# Patient Record
Sex: FEMALE | Race: WHITE | NOT HISPANIC OR LATINO | Employment: OTHER | URBAN - NONMETROPOLITAN AREA
[De-identification: names, ages, dates, MRNs, and addresses within clinical notes are randomized per-mention and may not be internally consistent; named-entity substitution may affect disease eponyms.]

---

## 2017-08-07 ENCOUNTER — APPOINTMENT (EMERGENCY)
Dept: RADIOLOGY | Facility: HOSPITAL | Age: 52
End: 2017-08-07
Payer: MEDICARE

## 2017-08-07 ENCOUNTER — HOSPITAL ENCOUNTER (EMERGENCY)
Facility: HOSPITAL | Age: 52
Discharge: HOME | End: 2017-08-07
Admitting: EMERGENCY MEDICINE
Payer: MEDICARE

## 2017-08-07 VITALS
TEMPERATURE: 97.5 F | DIASTOLIC BLOOD PRESSURE: 83 MMHG | RESPIRATION RATE: 18 BRPM | WEIGHT: 190 LBS | SYSTOLIC BLOOD PRESSURE: 143 MMHG | OXYGEN SATURATION: 97 % | HEART RATE: 82 BPM

## 2017-08-07 DIAGNOSIS — S81.809A MULTIPLE OPEN WOUNDS OF LOWER LEG: Primary | ICD-10-CM

## 2017-08-07 PROCEDURE — 73590 X-RAY EXAM OF LOWER LEG: CPT

## 2017-08-07 PROCEDURE — 99283 EMERGENCY DEPT VISIT LOW MDM: CPT

## 2017-08-07 RX ORDER — SULFAMETHOXAZOLE AND TRIMETHOPRIM 800; 160 MG/1; MG/1
2 TABLET ORAL EVERY 12 HOURS SCHEDULED
Qty: 40 TABLET | Refills: 0 | Status: SHIPPED | OUTPATIENT
Start: 2017-08-07 | End: 2017-08-17

## 2017-08-07 RX ORDER — ASPIRIN 325 MG
325 TABLET ORAL DAILY
COMMUNITY

## 2017-08-07 RX ORDER — LORAZEPAM 1 MG/1
0.5 TABLET ORAL
COMMUNITY

## 2017-08-27 ENCOUNTER — APPOINTMENT (EMERGENCY)
Dept: RADIOLOGY | Facility: HOSPITAL | Age: 52
End: 2017-08-27
Payer: MEDICARE

## 2017-08-27 ENCOUNTER — HOSPITAL ENCOUNTER (EMERGENCY)
Facility: HOSPITAL | Age: 52
Discharge: HOME | End: 2017-08-27
Admitting: EMERGENCY MEDICINE
Payer: MEDICARE

## 2017-08-27 VITALS
DIASTOLIC BLOOD PRESSURE: 68 MMHG | SYSTOLIC BLOOD PRESSURE: 124 MMHG | HEART RATE: 88 BPM | RESPIRATION RATE: 18 BRPM | WEIGHT: 192 LBS | OXYGEN SATURATION: 98 % | TEMPERATURE: 97.1 F
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
## 2017-08-27 DIAGNOSIS — M25.471 PAIN AND SWELLING OF RIGHT ANKLE: ICD-10-CM

## 2017-08-27 DIAGNOSIS — S80.811A ABRASION OF RIGHT LOWER LEG WITH INFECTION, INITIAL ENCOUNTER: ICD-10-CM

## 2017-08-27 DIAGNOSIS — M25.571 PAIN AND SWELLING OF RIGHT ANKLE: ICD-10-CM

## 2017-08-27 DIAGNOSIS — L08.9 ABRASION OF RIGHT LOWER LEG WITH INFECTION, INITIAL ENCOUNTER: ICD-10-CM

## 2017-08-27 PROCEDURE — 90715 TDAP VACCINE 7 YRS/> IM: CPT | Performed by: PHYSICIAN ASSISTANT

## 2017-08-27 PROCEDURE — 73610 X-RAY EXAM OF ANKLE: CPT

## 2017-08-27 PROCEDURE — 99283 EMERGENCY DEPT VISIT LOW MDM: CPT

## 2017-08-27 PROCEDURE — 90471 IMMUNIZATION ADMIN: CPT

## 2017-08-27 PROCEDURE — 73590 X-RAY EXAM OF LOWER LEG: CPT

## 2017-08-27 RX ORDER — SULFAMETHOXAZOLE AND TRIMETHOPRIM 800; 160 MG/1; MG/1
1 TABLET ORAL 2 TIMES DAILY
Qty: 14 TABLET | Refills: 0 | Status: SHIPPED | OUTPATIENT
Start: 2017-08-27 | End: 2017-09-03

## 2017-08-27 RX ORDER — HYDROCODONE BITARTRATE AND ACETAMINOPHEN 5; 325 MG/1; MG/1
1 TABLET ORAL EVERY 6 HOURS PRN
Qty: 10 TABLET | Refills: 0 | Status: SHIPPED | OUTPATIENT
Start: 2017-08-27

## 2017-08-27 RX ORDER — BACITRACIN, NEOMYCIN, POLYMYXIN B 400; 3.5; 5 [USP'U]/G; MG/G; [USP'U]/G
OINTMENT TOPICAL 2 TIMES DAILY
Qty: 15 G | Refills: 0 | Status: SHIPPED | OUTPATIENT
Start: 2017-08-27

## 2017-08-27 RX ADMIN — TETANUS TOXOID, REDUCED DIPHTHERIA TOXOID AND ACELLULAR PERTUSSIS VACCINE, ADSORBED 0.5 ML: 5; 2.5; 8; 8; 2.5 SUSPENSION INTRAMUSCULAR at 12:08

## 2020-06-17 ENCOUNTER — HOSPITAL ENCOUNTER (EMERGENCY)
Facility: HOSPITAL | Age: 55
Discharge: HOME | End: 2020-06-17
Attending: EMERGENCY MEDICINE | Admitting: EMERGENCY MEDICINE
Payer: MEDICARE

## 2020-06-17 VITALS
RESPIRATION RATE: 18 BRPM | TEMPERATURE: 97.6 F | WEIGHT: 194.45 LBS | SYSTOLIC BLOOD PRESSURE: 134 MMHG | HEART RATE: 82 BPM | OXYGEN SATURATION: 98 % | DIASTOLIC BLOOD PRESSURE: 73 MMHG

## 2020-06-17 DIAGNOSIS — Z51.89 VISIT FOR WOUND CHECK: Primary | ICD-10-CM

## 2020-06-17 DIAGNOSIS — S80.819A CAT SCRATCH OF LOWER LEG, INITIAL ENCOUNTER: ICD-10-CM
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
## 2020-06-17 PROCEDURE — 99282 EMERGENCY DEPT VISIT SF MDM: CPT

## 2020-06-17 PROCEDURE — 99284 EMERGENCY DEPT VISIT MOD MDM: CPT | Performed by: EMERGENCY MEDICINE

## 2020-06-17 RX ORDER — DOXYCYCLINE HYCLATE 100 MG/1
100 CAPSULE ORAL 2 TIMES DAILY
Qty: 14 CAPSULE | Refills: 0 | Status: SHIPPED | OUTPATIENT
Start: 2020-06-17 | End: 2020-06-24

## 2022-06-22 ENCOUNTER — CLINICAL SUPPORT (OUTPATIENT)
Dept: URGENT CARE | Facility: CLINIC | Age: 57
End: 2022-06-22

## 2022-06-22 DIAGNOSIS — R94.31 ABNORMAL EKG: Primary | ICD-10-CM

## 2023-08-23 ENCOUNTER — OFFICE VISIT (OUTPATIENT)
Dept: URGENT CARE | Facility: CLINIC | Age: 58
End: 2023-08-23
Payer: MEDICARE

## 2023-08-23 ENCOUNTER — APPOINTMENT (OUTPATIENT)
Dept: RADIOLOGY | Facility: CLINIC | Age: 58
End: 2023-08-23
Payer: MEDICARE

## 2023-08-23 VITALS
SYSTOLIC BLOOD PRESSURE: 136 MMHG | DIASTOLIC BLOOD PRESSURE: 70 MMHG | TEMPERATURE: 98 F | RESPIRATION RATE: 20 BRPM | OXYGEN SATURATION: 97 % | WEIGHT: 198.2 LBS | HEART RATE: 77 BPM

## 2023-08-23 DIAGNOSIS — J40 BRONCHITIS, NOT SPECIFIED AS ACUTE OR CHRONIC: Primary | ICD-10-CM

## 2023-08-23 DIAGNOSIS — R05.1 ACUTE COUGH: ICD-10-CM

## 2023-08-23 LAB
SARS-COV-2 AG UPPER RESP QL IA: NEGATIVE
VALID CONTROL: NORMAL

## 2023-08-23 PROCEDURE — 71046 X-RAY EXAM CHEST 2 VIEWS: CPT

## 2023-08-23 PROCEDURE — G0463 HOSPITAL OUTPT CLINIC VISIT: HCPCS

## 2023-08-23 PROCEDURE — 87811 SARS-COV-2 COVID19 W/OPTIC: CPT

## 2023-08-23 PROCEDURE — 99213 OFFICE O/P EST LOW 20 MIN: CPT

## 2023-08-23 RX ORDER — ALBUTEROL SULFATE 90 UG/1
2 AEROSOL, METERED RESPIRATORY (INHALATION) EVERY 6 HOURS PRN
Qty: 8.5 G | Refills: 0 | Status: SHIPPED | OUTPATIENT
Start: 2023-08-23

## 2023-08-23 RX ORDER — PREDNISONE 20 MG/1
40 TABLET ORAL DAILY
Qty: 10 TABLET | Refills: 0 | Status: SHIPPED | OUTPATIENT
Start: 2023-08-23 | End: 2023-08-28

## 2023-08-23 RX ORDER — AZITHROMYCIN 250 MG/1
TABLET, FILM COATED ORAL
Qty: 6 TABLET | Refills: 0 | Status: SHIPPED | OUTPATIENT
Start: 2023-08-23 | End: 2023-08-27

## 2023-08-23 RX ORDER — BENZONATATE 200 MG/1
200 CAPSULE ORAL 3 TIMES DAILY PRN
Qty: 20 CAPSULE | Refills: 0 | Status: SHIPPED | OUTPATIENT
Start: 2023-08-23

## 2023-08-23 NOTE — PROGRESS NOTES
[facility]        NAME: Linda Sparks is a 56 y.o. female  : 1965    MRN: [id number]  DATE: 2023  TIME: 12:19 PM    Assessment and Plan   Bronchitis, not specified as acute or chronic [J40]  1. Bronchitis, not specified as acute or chronic  Poct Covid 19 Rapid Antigen Test    Ambulatory Referral to [facility]    XR chest pa & lateral    benzonatate (TESSALON) 200 MG capsule    albuterol (ProAir HFA) 90 mcg/act inhaler    predniSONE 20 mg tablet    azithromycin (ZITHROMAX) 250 mg tablet        Rapid COVID negative  cxr- no acute pneumonia noted. Tessalon for cough, albuterol for sob, steroid and azithromycin for bronchitis and COPD exacerbation. Referral to PCP placed. Patient Instructions     Rapid COVID negative  Cxr- no acute findings to indicate pneumonia. Please start the steroid to help with inflammation. Take this once daily in the morning with food. Take antibiotic for inflammatory properties given underlying COPD. Use albuterol inhaler 2 puffs every 6 hours for shortness of breath or wheezing. Smoking cessation  Continue mucinex over the counter. Tessalon for cough as needed up to three times a day otherwise may use robitussin. Push fluids. Establish a PCP. If symptoms worsen proceed to the ED. Follow up with PCP in 3-5 days. Proceed to  ER if symptoms worsen. Chief Complaint     Chief Complaint   Patient presents with   • Cold Like Symptoms     Productive cough of green phlegm and chest congestion. History of Present Illness       Patient is a 56year old female who presents to the office today for productive cough for about 3 days. Is taking robitussin at home with some cough relief. Also complains of chest congestion. Cough is productive for yellow/green sputum. Denies fever, ear pain. Has decreased activity tolerance. Denies chest pain, wheezing. Does currently smoke about 1 pack a day, has cut down r/t illness. Does have hx of COPD. Review of Systems   Review of Systems   Constitutional: Negative for chills and fever. HENT: Positive for congestion. Negative for ear pain, postnasal drip, sinus pressure, sinus pain and sore throat. Respiratory: Positive for cough and shortness of breath. Negative for wheezing. Cardiovascular: Negative for chest pain and palpitations. Gastrointestinal: Negative for diarrhea, nausea and vomiting. All other systems reviewed and are negative. Current Medications       Current Outpatient Medications:   •  albuterol (ProAir HFA) 90 mcg/act inhaler, Inhale 2 puffs every 6 (six) hours as needed for wheezing or shortness of breath, Disp: 8.5 g, Rfl: 0  •  azithromycin (ZITHROMAX) 250 mg tablet, Take 2 tablets today then 1 tablet daily x 4 days, Disp: 6 tablet, Rfl: 0  •  benzonatate (TESSALON) 200 MG capsule, Take 1 capsule (200 mg total) by mouth 3 (three) times a day as needed for cough, Disp: 20 capsule, Rfl: 0  •  predniSONE 20 mg tablet, Take 2 tablets (40 mg total) by mouth daily for 5 days, Disp: 10 tablet, Rfl: 0    Current Allergies     Allergies as of 08/23/2023 - Reviewed 08/23/2023   Allergen Reaction Noted   • Codeine  08/07/2017   • Keflex [cephalexin]  08/07/2017   • Motrin [ibuprofen]  08/07/2017   • Penicillins  08/07/2017            The following portions of the patient's history were reviewed and updated as appropriate: allergies, current medications, past family history, past medical history, past social history, past surgical history and problem list.     Past Medical History:   Diagnosis Date   • COPD (chronic obstructive pulmonary disease) ([facility])    • Menieres disease        Past Surgical History:   Procedure Laterality Date   • HYSTERECTOMY         History reviewed. No pertinent family history. Medications have been verified.         Objective   /70   Pulse 77   Temp 98 °F (36.7 °C)   Resp 20   Wt 89.9 kg (198 lb 3.2 oz)   SpO2 97%        Physical Exam Physical Exam  Vitals and nursing note reviewed. Constitutional:       Appearance: She is normal weight. She is ill-appearing. HENT:      Right Ear: Tympanic membrane normal.      Left Ear: Tympanic membrane normal.      Nose: Congestion present. Mouth/Throat:      Mouth: Mucous membranes are moist.      Pharynx: Posterior oropharyngeal erythema present. Comments: Posterior pharynx erythema with post nasal drip. No tonsillar swelling or exudate noted. Cardiovascular:      Rate and Rhythm: Normal rate and regular rhythm. Pulses: Normal pulses. Heart sounds: Normal heart sounds. Pulmonary:      Breath sounds: Wheezing and rhonchi present. Comments: Harsh, wet productive cough. Yellow sputum observed. Lymphadenopathy:      Cervical: No cervical adenopathy. Skin:     Capillary Refill: Capillary refill takes less than 2 seconds. Neurological:      General: No focal deficit present. Mental Status: She is alert and oriented to person, place, and time.

## 2023-08-23 NOTE — PATIENT INSTRUCTIONS
Rapid COVID negative  Cxr- no acute findings to indicate pneumonia. Please start the steroid to help with inflammation. Take this once daily in the morning with food. Take antibiotic for inflammatory properties given underlying COPD. Use albuterol inhaler 2 puffs every 6 hours for shortness of breath or wheezing. Smoking cessation  Continue mucinex over the counter. Tessalon for cough as needed up to three times a day otherwise may use robitussin. Push fluids. Establish a PCP. If symptoms worsen proceed to the ED.

## 2024-03-13 ENCOUNTER — TELEPHONE (OUTPATIENT)
Dept: FAMILY MEDICINE CLINIC | Facility: CLINIC | Age: 59
End: 2024-03-13

## 2024-04-09 ENCOUNTER — OFFICE VISIT (OUTPATIENT)
Dept: URGENT CARE | Facility: CLINIC | Age: 59
End: 2024-04-09
Payer: COMMERCIAL

## 2024-04-09 ENCOUNTER — APPOINTMENT (OUTPATIENT)
Dept: RADIOLOGY | Facility: CLINIC | Age: 59
End: 2024-04-09
Payer: COMMERCIAL

## 2024-04-09 VITALS
HEART RATE: 90 BPM | DIASTOLIC BLOOD PRESSURE: 71 MMHG | OXYGEN SATURATION: 97 % | SYSTOLIC BLOOD PRESSURE: 144 MMHG | RESPIRATION RATE: 18 BRPM | TEMPERATURE: 98.1 F
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
## 2024-04-09 DIAGNOSIS — S22.41XA CLOSED FRACTURE OF MULTIPLE RIBS OF RIGHT SIDE, INITIAL ENCOUNTER: Primary | ICD-10-CM

## 2024-04-09 DIAGNOSIS — S40.011A CONTUSION OF RIGHT SCAPULA, INITIAL ENCOUNTER: ICD-10-CM

## 2024-04-09 PROCEDURE — 99213 OFFICE O/P EST LOW 20 MIN: CPT

## 2024-04-09 PROCEDURE — 73060 X-RAY EXAM OF HUMERUS: CPT

## 2024-04-09 PROCEDURE — 73030 X-RAY EXAM OF SHOULDER: CPT

## 2024-04-09 PROCEDURE — 71101 X-RAY EXAM UNILAT RIBS/CHEST: CPT

## 2024-04-09 RX ORDER — LORAZEPAM 1 MG/1
1 TABLET ORAL DAILY
COMMUNITY
Start: 2024-04-06

## 2024-04-09 RX ORDER — TRIAMTERENE AND HYDROCHLOROTHIAZIDE 37.5; 25 MG/1; MG/1
1 CAPSULE ORAL DAILY
COMMUNITY
Start: 2024-03-23

## 2024-04-09 RX ORDER — OXYCODONE HYDROCHLORIDE 10 MG/1
10 TABLET ORAL EVERY 6 HOURS PRN
Qty: 12 TABLET | Refills: 0 | Status: SHIPPED | OUTPATIENT
Start: 2024-04-09 | End: 2024-04-12

## 2024-04-09 NOTE — PROGRESS NOTES
[facility]        NAME: Zakiya Liriano is a 58 y.o. female  : 1965    MRN: [id number]  DATE: 2024  TIME: 3:26 PM    Assessment and Plan   Closed fracture of multiple ribs of right side, initial encounter [S22.41XA]  1. Closed fracture of multiple ribs of right side, initial encounter  oxyCODONE (ROXICODONE) 10 MG TABS      2. Fall, initial encounter  XR ribs right w pa chest min 3 views    XR humerus right    XR shoulder 2+ vw right      3. Contusion of right scapula, initial encounter          Rib fractures on right of rib 3 and 4. Will follow with official read, if more fx noted will send to ED> pt aware and agreeable  Incentive spirometer provided and instructed on proper use.  PDMP checked.  Will prescribe oxycodone for severe pain relief as patient is already using other modalities without relief. Has tolerated well in the past. Pt has appt with new PCP tomorrow.   No other osseous abnormalities noted.    Patient Instructions   Fracture of 2 ribs identified.   Radiology will read the xrays. If more ribs are found to be fractured then you will need to go to the ED.  Take tylenol and use lidocaine patches. May use oxycodone for severe pain as prescribed. Use cautions as it may cause drowsiness. Avoid use around same time as ativan.  Follow with PCP  Use incentive spirometer as instructed (10x ever 1 hour while awake)  Perform splinting as instructed.    Follow up with PCP in 3-5 days.  Proceed to  ER if symptoms worsen.    Chief Complaint     Chief Complaint   Patient presents with    Shoulder Injury     Right shoulder          History of Present Illness       Patient is a 58 year old female who presents to the office today for right shoulder pain. She notes that she fell out of bed 2 days ago and into the stairs. She did not come yesterday because she went to [place] to see the eclipse. She has pain in the right rib, humerus, chest, and back. Denies head strike. Denies LOC. Has been  using motrin and lidocaine patches for pain with minimal relief.     Shoulder Injury   Associated symptoms include chest pain (rib pain right side).       Review of Systems   Review of Systems   HENT:  Negative for congestion and rhinorrhea.    Respiratory:  Negative for cough, shortness of breath and wheezing.    Cardiovascular:  Positive for chest pain (rib pain right side).   Skin:  Positive for wound.   All other systems reviewed and are negative.        Current Medications       Current Outpatient Medications:     LORazepam (ATIVAN) 1 mg tablet, Take 1 mg by mouth daily, Disp: , Rfl:     oxyCODONE (ROXICODONE) 10 MG TABS, Take 1 tablet (10 mg total) by mouth every 6 (six) hours as needed for severe pain for up to 3 days Max Daily Amount: 40 mg, Disp: 12 tablet, Rfl: 0    triamterene-hydrochlorothiazide (DYAZIDE) 37.5-25 mg per capsule, Take 1 capsule by mouth daily, Disp: , Rfl:     albuterol (ProAir HFA) 90 mcg/act inhaler, Inhale 2 puffs every 6 (six) hours as needed for wheezing or shortness of breath, Disp: 8.5 g, Rfl: 0    benzonatate (TESSALON) 200 MG capsule, Take 1 capsule (200 mg total) by mouth 3 (three) times a day as needed for cough, Disp: 20 capsule, Rfl: 0    Current Allergies     Allergies as of 04/09/2024 - Reviewed 04/09/2024   Allergen Reaction Noted    Keflex [cephalexin]  08/07/2017    Motrin [ibuprofen]  08/07/2017    Penicillins  08/07/2017    Codeine Rash 08/07/2017            The following portions of the patient's history were reviewed and updated as appropriate: allergies, current medications, past family history, past medical history, past social history, past surgical history and problem list.     Past Medical History:   Diagnosis Date    COPD (chronic obstructive pulmonary disease) (HCC)     Menieres disease        Past Surgical History:   Procedure Laterality Date    HYSTERECTOMY         History reviewed. No pertinent family history.      Medications have been  verified.        Objective   /71   Pulse 90   Temp 98.1 °F (36.7 °C)   Resp 18   SpO2 97%        Physical Exam     Physical Exam  Vitals and nursing note reviewed.   Constitutional:       Appearance: Normal appearance. She is normal weight.   Cardiovascular:      Rate and Rhythm: Normal rate and regular rhythm.      Pulses: Normal pulses.      Heart sounds: Normal heart sounds.   Pulmonary:      Effort: Pulmonary effort is normal.      Breath sounds: Normal breath sounds.   Chest:       Musculoskeletal:         General: Tenderness present.        Arms:       Comments: Pain elicited with raising right arm, cough, deep breath   Skin:     General: Skin is warm.      Capillary Refill: Capillary refill takes less than 2 seconds.      Findings: Bruising present.   Neurological:      Mental Status: She is alert.

## 2024-04-09 NOTE — PATIENT INSTRUCTIONS
Fracture of 2 ribs identified.   Radiology will read the xrays. If more ribs are found to be fractured then you will need to go to the ED.  Take tylenol and use lidocaine patches. May use oxycodone for severe pain as prescribed. Use cautions as it may cause drowsiness. Avoid use around same time as ativan.  Follow with PCP  Use incentive spirometer as instructed (10x ever 1 hour while awake)  Perform splinting as instructed.

## 2024-04-13 ENCOUNTER — APPOINTMENT (EMERGENCY)
Dept: RADIOLOGY | Facility: HOSPITAL | Age: 59
End: 2024-04-13
Payer: COMMERCIAL

## 2024-04-13 ENCOUNTER — HOSPITAL ENCOUNTER (EMERGENCY)
Facility: HOSPITAL | Age: 59
Discharge: HOME | End: 2024-04-13
Attending: EMERGENCY MEDICINE | Admitting: EMERGENCY MEDICINE
Payer: COMMERCIAL

## 2024-04-13 VITALS
SYSTOLIC BLOOD PRESSURE: 142 MMHG | HEART RATE: 79 BPM | WEIGHT: 204.81 LBS | RESPIRATION RATE: 20 BRPM | DIASTOLIC BLOOD PRESSURE: 64 MMHG | OXYGEN SATURATION: 96 % | TEMPERATURE: 96.8 F

## 2024-04-13 DIAGNOSIS — S22.41XD CLOSED FRACTURE OF MULTIPLE RIBS OF RIGHT SIDE WITH ROUTINE HEALING, SUBSEQUENT ENCOUNTER: Primary | ICD-10-CM

## 2024-04-13 PROCEDURE — 99284 EMERGENCY DEPT VISIT MOD MDM: CPT | Performed by: EMERGENCY MEDICINE

## 2024-04-13 PROCEDURE — 71101 X-RAY EXAM UNILAT RIBS/CHEST: CPT

## 2024-04-13 PROCEDURE — 99283 EMERGENCY DEPT VISIT LOW MDM: CPT

## 2024-04-13 RX ORDER — OXYCODONE HYDROCHLORIDE 5 MG/1
5 TABLET ORAL EVERY 6 HOURS PRN
Qty: 11 TABLET | Refills: 0 | Status: SHIPPED | OUTPATIENT
Start: 2024-04-13 | End: 2024-04-16

## 2024-04-13 RX ORDER — OXYCODONE HYDROCHLORIDE 5 MG/1
5 TABLET ORAL ONCE
Status: COMPLETED | OUTPATIENT
Start: 2024-04-13 | End: 2024-04-13

## 2024-04-13 RX ORDER — ACETAMINOPHEN 325 MG/1
975 TABLET ORAL ONCE
Status: COMPLETED | OUTPATIENT
Start: 2024-04-13 | End: 2024-04-13

## 2024-04-13 RX ORDER — LIDOCAINE 50 MG/G
1 PATCH TOPICAL ONCE
Status: DISCONTINUED | OUTPATIENT
Start: 2024-04-13 | End: 2024-04-13 | Stop reason: HOSPADM

## 2024-04-13 RX ADMIN — OXYCODONE HYDROCHLORIDE 5 MG: 5 TABLET ORAL at 11:55

## 2024-04-13 RX ADMIN — ACETAMINOPHEN 975 MG: 325 TABLET, FILM COATED ORAL at 11:55

## 2024-04-13 RX ADMIN — LIDOCAINE 5% 1 PATCH: 700 PATCH TOPICAL at 11:54

## 2024-04-13 NOTE — ED PROVIDER NOTES
Final Diagnosis:  1. Closed fracture of multiple ribs of right side with routine healing, subsequent encounter        Chief Complaint   Patient presents with    Rib Pain     Feel about a week ago and was at Urgent Care and they told her that she has 2 broken ribs on the right side. Is in constant pain. Hurts when she coughs, sneezes. Not able to bend down      HPI  Patient presents for evaluation of right-sided chest/back pain.  Patient states that she fell out of bed a week or so ago and broke some ribs.  Review of records show that [facility] and diagnosed with 2-3 broken ribs.  But given her prescription for oxycodone.  Patient states that since then she has had consistent pain on that side.  Worse with movement and deep inspiration.  No other injuries.  No other complaints.      Unless otherwise specified:  - No language barrier.   - History obtained from patient.   - There are no limitations to the history obtained.  - Previous charting was reviewed    PMH:   has a past medical history of COPD (chronic obstructive pulmonary disease) (HCC) and Menieres disease.    PSH:   has a past surgical history that includes Hysterectomy.       ROS:  Review of Systems   - 13 point ROS was performed and all are normal unless stated in the history above.   - Nursing note reviewed. Vitals reviewed.   - Orders placed by myself and/or advanced practitioner / resident.        PE:   Vitals:    04/13/24 1123   BP: 142/64   BP Location: Left arm   Pulse: 79   Resp: 20   Temp: (!) 96.8 °F (36 °C)   TempSrc: Tympanic   SpO2: 96%   Weight: 92.9 kg (204 lb 12.9 oz)     Vitals reviewed by me.     Patient with tenderness to palpation in the right inframammary area as well as wrapping around the mid axillary to flank area.  Some bruising on the upper back near her scapula on the right side as well.  No actual work of breathing.  No dyspnea.  No tachypnea.  Unless otherwise specified above:    General: VS reviewed  Appears  in NAD    Head: Normocephalic, atraumatic  Eyes: EOM-I.     ENT: Atraumatic external nose and ears.    No drooling.     Neck: No JVD.    CV: No pallor noted  Lungs:   No tachypnea  No respiratory distress    Abdomen:  Soft, non-tender, non-distended    MSK:   No obvious deformity    Skin: Dry, intact. No obvious rash.    Psychiatric/Behavioral: Appropriate mood and affect   Exam: deferred    Physical Exam     Procedures   A:  - Nursing note reviewed.                      XR ribs with pa chest min 3 views RIGHT   ED Interpretation   Rib fractures redemonstrated.  I do not appreciate any significant effusion or pneumothorax.        Orders Placed This Encounter   Procedures    XR ribs with pa chest min 3 views RIGHT     Labs Reviewed - No data to display      Final Diagnosis:  1. Closed fracture of multiple ribs of right side with routine healing, subsequent encounter        P:  -I discussed options with patient.  Given that this has been weeks since the initial injury I have a low suspicion for worsening pathology.  Will get a chest x-ray to evaluate for significant effusion formation, pneumothorax, or severely worsening fractures which I believe is unlikely.  Will provide symptomatic relief and analgesia.  I discussed further evaluation such as CT and laboratory analysis if the patient felt that she may need to be admitted secondary to her discomfort.  She would like to first do the chest x-ray and analgesia and then decide on further management.  - Chest x-ray without any new acute pathology by my interpretation.  Patient felt better after symptomatic treatment here in emergency department.  Discussed management going forward.  She states that she cannot take Motrin or Aleve.  I discussed with her that she should be taking Tylenol around-the-clock and then use the oxycodone only when her pain was very severe.  Continue using incentive spirometry.  Return precautions reviewed      Unless otherwise noted the  patient's medications were reviewed and they should continue as directed.    Unless otherwise specified:  CC is exclusive from any separately billable procedures  CC is exclusive of treating other patients  CC is exclusive of teaching time     Medications   lidocaine (LIDODERM) 5 % patch 1 patch (1 patch Topical Medication Applied 4/13/24 1154)   acetaminophen (TYLENOL) tablet 975 mg (975 mg Oral Given 4/13/24 1155)   oxyCODONE (ROXICODONE) IR tablet 5 mg (5 mg Oral Given 4/13/24 1155)     Time reflects when diagnosis was documented in both MDM as applicable and the Disposition within this note       Time User Action Codes Description Comment    4/13/2024 12:47 PM Abdiaziz Watts Add [S22.41XD] Closed fracture of multiple ribs of right side with routine healing, subsequent encounter           ED Disposition       ED Disposition   Discharge    Condition   Stable    Date/Time   Sat Apr 13, 2024 12:47 PM    Comment   Zakiya Liriano discharge to home/self care.                   Follow-up Information       Follow up With Specialties Details Why Contact Info    Jarvis Ovalle MD Family Medicine   [address] [place] [address]  [phone]            Patient's Medications   Discharge Prescriptions    OXYCODONE (ROXICODONE) 5 IMMEDIATE RELEASE TABLET    Take 1 tablet (5 mg total) by mouth every 6 (six) hours as needed for moderate pain for up to 11 doses Max Daily Amount: 20 mg       Start Date: 4/13/2024 End Date: --       Order Dose: 5 mg       Quantity: 11 tablet    Refills: 0     No discharge procedures on file.  Prior to Admission Medications   Prescriptions Last Dose Informant Patient Reported? Taking?   LORazepam (ATIVAN) 1 mg tablet 4/12/2024  Yes Yes   Sig: Take 1 mg by mouth daily   albuterol (ProAir HFA) 90 mcg/act inhaler   No No   Sig: Inhale 2 puffs every 6 (six) hours as needed for wheezing or shortness of breath   benzonatate (TESSALON) 200 MG capsule   No No   Sig: Take 1 capsule (200 mg total)  "by mouth 3 (three) times a day as needed for cough   oxyCODONE (ROXICODONE) 10 MG TABS   No No   Sig: Take 1 tablet (10 mg total) by mouth every 6 (six) hours as needed for severe pain for up to 3 days Max Daily Amount: 40 mg   triamterene-hydrochlorothiazide (DYAZIDE) 37.5-25 mg per capsule 4/13/2024  Yes Yes   Sig: Take 1 capsule by mouth daily      Facility-Administered Medications: None       Portions of the record may have been created with voice recognition software. Occasional wrong word or \"sound a like\" substitutions may have occurred due to the inherent limitations of voice recognition software. Read the chart carefully and recognize, using context, where substitutions have occurred.    Electronically signed by:  MD Abdiaziz Vilchis MD  04/13/24 1153    "

## 2024-04-16 ENCOUNTER — OFFICE VISIT (OUTPATIENT)
Dept: FAMILY MEDICINE CLINIC | Facility: CLINIC | Age: 59
End: 2024-04-16
Payer: COMMERCIAL

## 2024-04-16 VITALS
DIASTOLIC BLOOD PRESSURE: 60 MMHG | WEIGHT: 201.2 LBS | HEART RATE: 87 BPM | TEMPERATURE: 97.7 F | OXYGEN SATURATION: 96 % | SYSTOLIC BLOOD PRESSURE: 110 MMHG | BODY MASS INDEX: 30.49 KG/M2 | HEIGHT: 68 IN

## 2024-04-16 DIAGNOSIS — Z13.0 SCREENING FOR DEFICIENCY ANEMIA: ICD-10-CM

## 2024-04-16 DIAGNOSIS — S22.41XD CLOSED FRACTURE OF MULTIPLE RIBS OF RIGHT SIDE WITH ROUTINE HEALING, SUBSEQUENT ENCOUNTER: Primary | ICD-10-CM

## 2024-04-16 DIAGNOSIS — Z78.0 ASYMPTOMATIC MENOPAUSAL STATE: ICD-10-CM

## 2024-04-16 DIAGNOSIS — H81.02 MENIERE'S DISEASE OF LEFT EAR: ICD-10-CM

## 2024-04-16 DIAGNOSIS — Z13.820 OSTEOPOROSIS SCREENING: ICD-10-CM

## 2024-04-16 DIAGNOSIS — Z13.6 SCREENING FOR CARDIOVASCULAR CONDITION: ICD-10-CM

## 2024-04-16 DIAGNOSIS — Z12.11 SCREEN FOR COLON CANCER: ICD-10-CM

## 2024-04-16 DIAGNOSIS — Z12.31 SCREENING MAMMOGRAM, ENCOUNTER FOR: ICD-10-CM

## 2024-04-16 DIAGNOSIS — Z13.29 SCREENING FOR THYROID DISORDER: ICD-10-CM

## 2024-04-16 PROBLEM — S22.41XA MULTIPLE CLOSED FRACTURES OF RIBS OF RIGHT SIDE: Status: ACTIVE | Noted: 2024-04-16

## 2024-04-16 PROCEDURE — G2211 COMPLEX E/M VISIT ADD ON: HCPCS

## 2024-04-16 PROCEDURE — 99213 OFFICE O/P EST LOW 20 MIN: CPT

## 2024-04-16 RX ORDER — OXYCODONE HYDROCHLORIDE 5 MG/1
5 TABLET ORAL EVERY 6 HOURS PRN
Qty: 11 TABLET | Refills: 0 | Status: SHIPPED | OUTPATIENT
Start: 2024-04-16

## 2024-04-16 RX ORDER — NALOXONE HYDROCHLORIDE 4 MG/.1ML
SPRAY NASAL
Qty: 1 EACH | Refills: 1 | Status: SHIPPED | OUTPATIENT
Start: 2024-04-16 | End: 2025-04-16

## 2024-04-16 NOTE — ASSESSMENT & PLAN NOTE
Patient is prescribed 3 more days of oxycodone.  PDMP consistent with these prescriptions that she stated today.  Educated on side effects of medication, and is to go to ER if they present.  Patient also prescribed Narcan due to her prescription of Ativan as well it is indicated.  After these 3 days, patient should no longer need pain medication.  Will get repeat imaging at 6-week maria isabel to ensure healing.

## 2024-04-16 NOTE — PROGRESS NOTES
Name: Zakiya Liriano      : 1965      MRN: [id number]  Encounter Provider: Juan Carlos Matt PA-C  Encounter Date: 2024   Encounter department: [facility]    Assessment & Plan     1. Closed fracture of multiple ribs of right side with routine healing, subsequent encounter  Assessment & Plan:  Patient is prescribed 3 more days of oxycodone.  PDMP consistent with these prescriptions that she stated today.  Educated on side effects of medication, and is to go to ER if they present.  Patient also prescribed Narcan due to her prescription of Ativan as well it is indicated.  After these 3 days, patient should no longer need pain medication.  Will get repeat imaging at 6-week maria isabel to ensure healing.    Orders:  -     DXA bone density spine hip and pelvis; Future  -     XR ribs bilateral 3 vw; Future; Expected date: 2024  -     oxyCODONE (Roxicodone) 5 immediate release tablet; Take 1 tablet (5 mg total) by mouth every 6 (six) hours as needed for moderate pain for up to 11 doses Max Daily Amount: 20 mg  -     naloxone (NARCAN) 4 mg/0.1 mL nasal spray; Administer 1 spray into a nostril. If no response after 2-3 minutes, give another dose in the other nostril using a new spray.    2. Meniere's disease of left ear  Assessment & Plan:  Stable.  Continue Dyazide daily.  Contact office with exacerbation.      3. Screening for cardiovascular condition  -     Comprehensive metabolic panel; Future  -     Lipid panel; Future    4. Screening for deficiency anemia  -     CBC and differential; Future    5. Screening mammogram, encounter for  -     Mammo screening bilateral w 3d & cad; Future    6. Screen for colon cancer  -     Ambulatory Referral to Gastroenterology; Future    7. Osteoporosis screening  -     DXA bone density spine hip and pelvis; Future    8. Screening for thyroid disorder  -     TSH, 3rd generation with Free T4 reflex; Future    9. Asymptomatic menopausal state  -      DXA bone density spine hip and pelvis; Future           Subjective      Patient is a 58-year-old female presenting to establish care.  Patient broke her right ribs 1 week ago after falling out of bed while trying to see the solar eclipse.  Patient was given oxycodone 2 separate times for this, and this helps her pain.  Patient is still in a severe amount of pain, and is exacerbated with breathing.  Imaging showed routine healing, with no pneumothorax.  Patient also uses a brace which will help her pain.  Patient is allergic to NSAIDs.  Patient has a history of Ménière's disease.  Patient has no other concerns today.      Review of Systems   Constitutional:  Negative for appetite change, chills, diaphoresis, fatigue and fever.   HENT:  Negative for congestion, ear discharge, ear pain, postnasal drip, rhinorrhea, sinus pressure, sinus pain, sneezing and sore throat.    Eyes:  Negative for pain, discharge, redness, itching and visual disturbance.   Respiratory:  Negative for apnea, cough, chest tightness, shortness of breath and wheezing.    Cardiovascular:  Negative for chest pain, palpitations and leg swelling.   Gastrointestinal:  Negative for abdominal pain, blood in stool, constipation, diarrhea, nausea and vomiting.   Endocrine: Negative for cold intolerance, heat intolerance, polydipsia and polyuria.   Genitourinary:  Negative for dysuria, flank pain, frequency, hematuria and urgency.   Musculoskeletal:  Negative for arthralgias, back pain, myalgias, neck pain and neck stiffness.        Right rib pain worse with inspiration   Skin:  Negative for color change and rash.   Allergic/Immunologic: Negative for environmental allergies and food allergies.   Neurological:  Negative for dizziness, tremors, seizures, syncope, speech difficulty, weakness, light-headedness, numbness and headaches.   Hematological:  Negative for adenopathy. Does not bruise/bleed easily.   Psychiatric/Behavioral:  Negative for agitation,  "confusion, decreased concentration, dysphoric mood, hallucinations, self-injury, sleep disturbance and suicidal ideas. The patient is not nervous/anxious and is not hyperactive.    All other systems reviewed and are negative.      Current Outpatient Medications on File Prior to Visit   Medication Sig    LORazepam (ATIVAN) 1 mg tablet Take 1 mg by mouth daily    triamterene-hydrochlorothiazide (DYAZIDE) 37.5-25 mg per capsule Take 1 capsule by mouth daily    [DISCONTINUED] albuterol (ProAir HFA) 90 mcg/act inhaler Inhale 2 puffs every 6 (six) hours as needed for wheezing or shortness of breath (Patient not taking: Reported on 4/16/2024)    [DISCONTINUED] benzonatate (TESSALON) 200 MG capsule Take 1 capsule (200 mg total) by mouth 3 (three) times a day as needed for cough (Patient not taking: Reported on 4/16/2024)    [DISCONTINUED] oxyCODONE (Roxicodone) 5 immediate release tablet Take 1 tablet (5 mg total) by mouth every 6 (six) hours as needed for moderate pain for up to 11 doses Max Daily Amount: 20 mg (Patient not taking: Reported on 4/16/2024)       Objective     /60 (BP Location: Left arm, Patient Position: Sitting)   Pulse 87   Temp 97.7 °F (36.5 °C) (Tympanic)   Ht 5' 8\" (1.727 m)   Wt 91.3 kg (201 lb 3.2 oz)   SpO2 96%   BMI 30.59 kg/m²     Physical Exam  Vitals and nursing note reviewed.   Constitutional:       Appearance: Normal appearance. She is normal weight. She is not ill-appearing or toxic-appearing.   HENT:      Head: Normocephalic and atraumatic.      Right Ear: Tympanic membrane normal.      Left Ear: Tympanic membrane normal.      Nose: Nose normal.      Mouth/Throat:      Mouth: Mucous membranes are moist.      Pharynx: Oropharynx is clear.   Eyes:      Extraocular Movements: Extraocular movements intact.      Conjunctiva/sclera: Conjunctivae normal.      Pupils: Pupils are equal, round, and reactive to light.   Cardiovascular:      Rate and Rhythm: Normal rate and regular rhythm.    "   Pulses: Normal pulses.      Heart sounds: Normal heart sounds. No murmur heard.  Pulmonary:      Effort: Pulmonary effort is normal. No respiratory distress.      Breath sounds: Normal breath sounds. No stridor. No wheezing, rhonchi or rales.   Chest:      Chest wall: Tenderness present.   Abdominal:      General: Bowel sounds are normal.      Palpations: Abdomen is soft. There is no mass.      Tenderness: There is no abdominal tenderness.   Musculoskeletal:         General: Tenderness (To palpation and inspiration of right chest wall.) present. Normal range of motion.      Cervical back: Normal range of motion and neck supple. No tenderness.      Right lower leg: No edema.      Left lower leg: No edema.   Lymphadenopathy:      Cervical: No cervical adenopathy.   Skin:     General: Skin is warm.      Capillary Refill: Capillary refill takes less than 2 seconds.      Findings: No erythema, lesion or rash.   Neurological:      General: No focal deficit present.      Mental Status: She is alert and oriented to person, place, and time. Mental status is at baseline.      Motor: No weakness.      Coordination: Coordination normal.      Gait: Gait normal.   Psychiatric:         Mood and Affect: Mood normal.         Behavior: Behavior normal.         Thought Content: Thought content normal.         Judgment: Judgment normal.       Juan Carlos Matt PA-C

## 2024-09-27 ENCOUNTER — TELEPHONE (OUTPATIENT)
Dept: FAMILY MEDICINE CLINIC | Facility: CLINIC | Age: 59
End: 2024-09-27

## 2024-09-27 NOTE — TELEPHONE ENCOUNTER
Left  for pt to schedule her AWV.  PCP is Juan Carlos, but was also scheduled as a new pt for Dr. Ovalle.  If pt calls back, please ask if she will be staying in the [place] office or going to [place] with Dr. Ovalle.  If she is staying in [place], please schedule her AWV.

## 2025-01-10 ENCOUNTER — TELEPHONE (OUTPATIENT)
Dept: FAMILY MEDICINE CLINIC | Facility: CLINIC | Age: 60
End: 2025-01-10

## 2025-01-10 NOTE — TELEPHONE ENCOUNTER
Left vm for pt telling her to call the office to reschedule her AWV that was missed. Also, she had an appt scheduled with Dr. Ovalle that was also missed.  We need to know which office she will be seen at in order to make an appt.

## 2025-03-07 ENCOUNTER — OFFICE VISIT (OUTPATIENT)
Dept: FAMILY MEDICINE CLINIC | Facility: CLINIC | Age: 60
End: 2025-03-07
Payer: COMMERCIAL

## 2025-03-07 VITALS
TEMPERATURE: 98.7 F | SYSTOLIC BLOOD PRESSURE: 120 MMHG | BODY MASS INDEX: 31.05 KG/M2 | HEIGHT: 68 IN | WEIGHT: 204.9 LBS | RESPIRATION RATE: 18 BRPM | OXYGEN SATURATION: 96 % | DIASTOLIC BLOOD PRESSURE: 68 MMHG | HEART RATE: 70 BPM

## 2025-03-07 DIAGNOSIS — Z53.20 SCREENING FOR HEPATITIS C DECLINED: ICD-10-CM

## 2025-03-07 DIAGNOSIS — Z11.4 SCREENING FOR HIV (HUMAN IMMUNODEFICIENCY VIRUS): ICD-10-CM

## 2025-03-07 DIAGNOSIS — J44.9 CHRONIC OBSTRUCTIVE PULMONARY DISEASE, UNSPECIFIED COPD TYPE (HCC): ICD-10-CM

## 2025-03-07 DIAGNOSIS — M25.511 ACUTE PAIN OF RIGHT SHOULDER: Primary | ICD-10-CM

## 2025-03-07 DIAGNOSIS — Z12.31 SCREENING MAMMOGRAM, ENCOUNTER FOR: ICD-10-CM

## 2025-03-07 DIAGNOSIS — Z12.11 SCREEN FOR COLON CANCER: ICD-10-CM

## 2025-03-07 DIAGNOSIS — Z13.6 SCREENING FOR ISCHEMIC HEART DISEASE (IHD): ICD-10-CM

## 2025-03-07 DIAGNOSIS — Z13.6 SCREENING FOR CARDIOVASCULAR CONDITION: ICD-10-CM

## 2025-03-07 DIAGNOSIS — S22.41XD CLOSED FRACTURE OF MULTIPLE RIBS OF RIGHT SIDE WITH ROUTINE HEALING, SUBSEQUENT ENCOUNTER: ICD-10-CM

## 2025-03-07 PROCEDURE — 99214 OFFICE O/P EST MOD 30 MIN: CPT | Performed by: FAMILY MEDICINE

## 2025-03-07 RX ORDER — OXYCODONE HYDROCHLORIDE 5 MG/1
5 TABLET ORAL EVERY 6 HOURS PRN
Qty: 30 TABLET | Refills: 0 | Status: SHIPPED | OUTPATIENT
Start: 2025-03-07

## 2025-03-07 NOTE — PROGRESS NOTES
Name: Zakiya Liriano      : 1965      MRN: [id number]  Encounter Provider: Jarvis Ovalle MD  Encounter Date: 3/7/2025   Encounter department: [facility]  :  Assessment & Plan  Acute pain of right shoulder    Orders:    XR shoulder 2+ vw right; Future    Ambulatory Referral to Physical Therapy; Future    Screening for ischemic heart disease (IHD)    Orders:    Lipid panel; Future    Comprehensive metabolic panel; Future    Chronic obstructive pulmonary disease, unspecified COPD type (HCC)         Screening for hepatitis C declined    Orders:    Hepatitis C antibody; Future    Screening for HIV (human immunodeficiency virus)    Orders:    HIV 1/2 AG/AB w Reflex [facility] for 2 yr old and above; Future    Screening mammogram, encounter for    Orders:    Mammo screening bilateral w 3d and cad; Future    Screening for cardiovascular condition         Screen for colon cancer    Orders:    Cologuard    Closed fracture of multiple ribs of right side with routine healing, subsequent encounter    Orders:    naloxone (NARCAN) 4 mg/0.1 mL nasal spray; Administer 1 spray into a nostril. If no response after 2-3 minutes, give another dose in the other nostril using a new spray.    oxyCODONE (Roxicodone) 5 immediate release tablet; Take 1 tablet (5 mg total) by mouth every 6 (six) hours as needed for moderate pain for up to 11 doses Max Daily Amount: 20 mg           History of Present Illness   She has right shoulder pain with decreased ROM.  It has been present for about a week.  She does not recall a precipitating event.  She had pain the that shoulder over the summer, but that was not as intense and it resolved on its own.  The pain is constant and aching in character.  Movement makes it worse; nothing makes it better.  She has tried Advil OTC.      Review of Systems   Musculoskeletal:  Positive for arthralgias and joint swelling.   All other systems reviewed and are  "negative.      Objective   /68 (BP Location: Left arm, Patient Position: Sitting, Cuff Size: Large)   Pulse 70   Temp 98.7 °F (37.1 °C) (Temporal)   Resp 18   Ht 5' 8\" (1.727 m)   Wt 92.9 kg (204 lb 14.4 oz)   SpO2 96%   BMI 31.15 kg/m²      Physical Exam  Vitals and nursing note reviewed.   Constitutional:       Appearance: Normal appearance. She is obese.   Cardiovascular:      Rate and Rhythm: Normal rate and regular rhythm.      Pulses: Normal pulses.      Heart sounds: Normal heart sounds.   Pulmonary:      Effort: Pulmonary effort is normal.      Breath sounds: Normal breath sounds.   Abdominal:      General: Abdomen is flat. Bowel sounds are normal.      Palpations: Abdomen is soft.   Musculoskeletal:      Cervical back: Normal range of motion and neck supple.      Comments: Left arm, decreased range of motion with forward flexion and reverse flexion.  Decreased range of motion with abduction.  No point tenderness.  Treatment signs.   Skin:     General: Skin is warm and dry.      Capillary Refill: Capillary refill takes less than 2 seconds.   Neurological:      General: No focal deficit present.      Mental Status: She is alert and oriented to person, place, and time. Mental status is at baseline.   Psychiatric:         Mood and Affect: Mood normal.         Behavior: Behavior normal.         Thought Content: Thought content normal.         Judgment: Judgment normal.         "

## 2025-03-08 ENCOUNTER — APPOINTMENT (OUTPATIENT)
Dept: RADIOLOGY | Facility: MEDICAL CENTER | Age: 60
End: 2025-03-08
Payer: COMMERCIAL

## 2025-03-08 DIAGNOSIS — M25.511 ACUTE PAIN OF RIGHT SHOULDER: ICD-10-CM

## 2025-03-08 PROCEDURE — 73030 X-RAY EXAM OF SHOULDER: CPT

## 2025-03-09 NOTE — ASSESSMENT & PLAN NOTE
Orders:    naloxone (NARCAN) 4 mg/0.1 mL nasal spray; Administer 1 spray into a nostril. If no response after 2-3 minutes, give another dose in the other nostril using a new spray.    oxyCODONE (Roxicodone) 5 immediate release tablet; Take 1 tablet (5 mg total) by mouth every 6 (six) hours as needed for moderate pain for up to 11 doses Max Daily Amount: 20 mg

## 2025-03-13 ENCOUNTER — RESULTS FOLLOW-UP (OUTPATIENT)
Dept: FAMILY MEDICINE CLINIC | Facility: CLINIC | Age: 60
End: 2025-03-13

## 2025-04-04 ENCOUNTER — OFFICE VISIT (OUTPATIENT)
Dept: FAMILY MEDICINE CLINIC | Facility: CLINIC | Age: 60
End: 2025-04-04
Payer: COMMERCIAL

## 2025-04-04 VITALS
OXYGEN SATURATION: 96 % | SYSTOLIC BLOOD PRESSURE: 134 MMHG | HEART RATE: 90 BPM | BODY MASS INDEX: 30.31 KG/M2 | TEMPERATURE: 98.2 F | WEIGHT: 200 LBS | DIASTOLIC BLOOD PRESSURE: 80 MMHG | RESPIRATION RATE: 18 BRPM | HEIGHT: 68 IN

## 2025-04-04 DIAGNOSIS — S22.41XD CLOSED FRACTURE OF MULTIPLE RIBS OF RIGHT SIDE WITH ROUTINE HEALING, SUBSEQUENT ENCOUNTER: ICD-10-CM

## 2025-04-04 DIAGNOSIS — F11.20 OPIOID DEPENDENCE, UNCOMPLICATED (HCC): ICD-10-CM

## 2025-04-04 DIAGNOSIS — I87.2 VENOUS INSUFFICIENCY: Primary | ICD-10-CM

## 2025-04-04 PROCEDURE — 99214 OFFICE O/P EST MOD 30 MIN: CPT | Performed by: FAMILY MEDICINE

## 2025-04-04 NOTE — PROGRESS NOTES
"Name: Zakiya Liriano      : 1965      MRN: [id number]  Encounter Provider: Jarvis Ovalle MD  Encounter Date: 2025   Encounter department: [facility]  :  Assessment & Plan  Closed fracture of multiple ribs of right side with routine healing, subsequent encounter    Orders:    oxyCODONE (Roxicodone) 5 immediate release tablet; Take 1 tablet (5 mg total) by mouth every 6 (six) hours as needed for moderate pain for up to 11 doses Max Daily Amount: 20 mg    Venous insufficiency    Orders:    Compression Stocking    VAS Lower extremity venous insufficiency duplex, bilateral w/ measurements; Future  She has varicose veins.  She would benefit from compression garments.  She has not had a baseline ultrasound at this point.  Will be ordered for her.  Opioid dependence, uncomplicated (HCC)                History of Present Illness   She has right shoulder pain x 6 weeks.  Oxycodone helped her sleep.  She took OTC NSAIDs without relief.  She is allergic to Tylenol (although this is probably more an allergy to Tylenol 3).  It is aching in character.  It is intermittent.  Running the sweeper bothers it.  Rolling over on it at night hurts.  She has moderate OA on x-ray.  She has no F/C.  She has no constitutional symptoms.    She has aching intermittent pain of both lower extremities.  She has some intermittent edema as well.  She has no trauma.  She denies fevers or chills.  She has no recurring myalgia.      Review of Systems   All other systems reviewed and are negative.      Objective   /80 (BP Location: Left arm, Patient Position: Sitting, Cuff Size: Large)   Pulse 90   Temp 98.2 °F (36.8 °C) (Temporal)   Resp 18   Ht 5' 8\" (1.727 m)   Wt 90.7 kg (200 lb) Comment: Provided by pt  SpO2 96%   BMI 30.41 kg/m²      Physical Exam  Vitals and nursing note reviewed.   Constitutional:       Appearance: Normal appearance. She is obese.   Cardiovascular:      Rate and " Rhythm: Normal rate and regular rhythm.      Pulses: Normal pulses.      Heart sounds: Normal heart sounds.   Pulmonary:      Effort: Pulmonary effort is normal.      Breath sounds: Normal breath sounds.   Abdominal:      General: Abdomen is flat. Bowel sounds are normal.      Palpations: Abdomen is soft.   Musculoskeletal:         General: Normal range of motion.      Cervical back: Normal range of motion and neck supple.   Skin:     General: Skin is warm and dry.      Capillary Refill: Capillary refill takes less than 2 seconds.   Neurological:      General: No focal deficit present.      Mental Status: She is alert and oriented to person, place, and time. Mental status is at baseline.   Psychiatric:         Mood and Affect: Mood normal.         Behavior: Behavior normal.         Thought Content: Thought content normal.         Judgment: Judgment normal.

## 2025-04-07 ENCOUNTER — TELEPHONE (OUTPATIENT)
Dept: FAMILY MEDICINE CLINIC | Facility: CLINIC | Age: 60
End: 2025-04-07

## 2025-04-07 NOTE — TELEPHONE ENCOUNTER
Pt called, stated she was to get pain medication at appt, pt stated pharmacy did not receive rx, pt asking if you can resend  ty

## 2025-04-08 PROBLEM — I87.2 VENOUS INSUFFICIENCY: Status: ACTIVE | Noted: 2025-04-08

## 2025-04-08 PROBLEM — F11.20 OPIOID DEPENDENCE, UNCOMPLICATED (HCC): Status: ACTIVE | Noted: 2025-04-08

## 2025-04-08 RX ORDER — OXYCODONE HYDROCHLORIDE 5 MG/1
5 TABLET ORAL EVERY 6 HOURS PRN
Qty: 30 TABLET | Refills: 0 | Status: SHIPPED | OUTPATIENT
Start: 2025-04-08

## 2025-04-08 NOTE — ASSESSMENT & PLAN NOTE
Orders:    oxyCODONE (Roxicodone) 5 immediate release tablet; Take 1 tablet (5 mg total) by mouth every 6 (six) hours as needed for moderate pain for up to 11 doses Max Daily Amount: 20 mg

## 2025-04-08 NOTE — ASSESSMENT & PLAN NOTE
Orders:    Compression Stocking    VAS Lower extremity venous insufficiency duplex, bilateral w/ measurements; Future  She has varicose veins.  She would benefit from compression garments.  She has not had a baseline ultrasound at this point.  Will be ordered for her.

## 2025-04-22 ENCOUNTER — VBI (OUTPATIENT)
Dept: ADMINISTRATIVE | Facility: OTHER | Age: 60
End: 2025-04-22

## 2025-04-22 NOTE — TELEPHONE ENCOUNTER
04/22/25 2:55 PM     Chart reviewed for Mammogram ; nothing is submitted to the patient's insurance at this time.     Rhonda Caraballo   [facility]

## 2025-08-15 ENCOUNTER — VBI (OUTPATIENT)
Dept: ADMINISTRATIVE | Facility: OTHER | Age: 60
End: 2025-08-15